# Patient Record
Sex: MALE | Race: WHITE | URBAN - NONMETROPOLITAN AREA
[De-identification: names, ages, dates, MRNs, and addresses within clinical notes are randomized per-mention and may not be internally consistent; named-entity substitution may affect disease eponyms.]

---

## 2021-10-05 ENCOUNTER — IMPORTED ENCOUNTER (OUTPATIENT)
Dept: URBAN - NONMETROPOLITAN AREA CLINIC 1 | Facility: CLINIC | Age: 51
End: 2021-10-05

## 2021-10-05 PROBLEM — E11.3413: Noted: 2021-10-05

## 2021-10-05 PROCEDURE — 92004 COMPRE OPH EXAM NEW PT 1/>: CPT

## 2021-10-05 NOTE — PATIENT DISCUSSION
DM with severe NPDR and mac edema-Stressed the importance of keeping blood sugars under control blood pressure under control and weight normalization and regular visits with PCP. -Explained the possible effects of poorly controlled diabetes and the damage that diabetes can cause to ocular health. -Patient to check HgbA1C.-Pt instructed to contact our office with any vision changes. PT TO CONTACT [facility] THEN REFER TO RETINAL SPECIALIST FOR EVAL AND [address] [place] [id number]

## 2022-04-10 ASSESSMENT — TONOMETRY
OD_IOP_MMHG: 16
OS_IOP_MMHG: 16

## 2022-04-10 ASSESSMENT — VISUAL ACUITY
OD_CC: 20/200
OU_CC: 20/200
OS_SC: 20/200
OU_CC: 20/200
OU_SC: 20/200
OD_SC: 20/200
OS_CC: 20/200

## 2022-11-15 ENCOUNTER — EMERGENCY VISIT (OUTPATIENT)
Dept: RURAL CLINIC 1 | Facility: CLINIC | Age: 52
End: 2022-11-15

## 2022-11-15 DIAGNOSIS — E11.3293: ICD-10-CM

## 2022-11-15 PROCEDURE — 99213 OFFICE O/P EST LOW 20 MIN: CPT

## 2022-11-15 ASSESSMENT — VISUAL ACUITY
OS_CC: 20/50
OS_CC: 20/70

## 2022-11-15 NOTE — PATIENT DISCUSSION
DM with severe NPDR and mac edema-Stressed the importance of keeping blood sugars under control blood pressure under control and weight normalization and regular visits with PCP. -Explained the possible effects of poorly controlled diabetes and the damage that diabetes can cause to ocular health. -Patient to check HgbA1C.-Pt instructed to contact our office with any vision changes. PT TO CONTACT [facility] THEN REFER TO RETINAL SPECIALIST FOR EVAL AND TX.